# Patient Record
Sex: FEMALE
[De-identification: names, ages, dates, MRNs, and addresses within clinical notes are randomized per-mention and may not be internally consistent; named-entity substitution may affect disease eponyms.]

---

## 2019-11-14 ENCOUNTER — HOSPITAL ENCOUNTER (OUTPATIENT)
Dept: HOSPITAL 5 - FLUORO | Age: 79
Discharge: HOME | End: 2019-11-14
Attending: INTERNAL MEDICINE
Payer: MEDICARE

## 2019-11-14 DIAGNOSIS — Z88.8: ICD-10-CM

## 2019-11-14 DIAGNOSIS — K44.9: Primary | ICD-10-CM

## 2019-11-14 DIAGNOSIS — K22.4: ICD-10-CM

## 2019-11-14 DIAGNOSIS — Z88.5: ICD-10-CM

## 2019-11-14 PROCEDURE — 74246 X-RAY XM UPR GI TRC 2CNTRST: CPT

## 2019-11-14 NOTE — FLUOROSCOPY REPORT
UPPER GI SERIES WITH AIR CONTRAST



HISTORY: Oral pharyngeal dysphagia, functional dyspepsia



FINDINGS: 

2.0 minutes of fluoroscopy time was utilized. 50 fluoroscopic images were captured.



Deglutition is normal. No evidence for aspiration. A prominent cricopharyngeal muscle is identified s
uggesting cricopharyngeal dysphagia. The remainder of the esophagus is normal caliber and mucosal pat
tern. A small sliding hiatal hernia was witnessed with occasional episodes of reflux into the mid to 
distal esophagus. Occasional esophageal spasm was also witnessed.



There is mild mucosal thickening throughout the stomach suggesting a mild gastritis. No evidence for 
ulceration or mass.



The duodenal bulb and duodenal sweep are within normal limits.



IMPRESSION:

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

Small sliding hiatal hernia with occasional episodes of reflux.

Mild esophageal spasm.

Mild gastritis is suspected.



Signer Name: Mac Jara Jr, MD 

Signed: 11/14/2019 10:04 AM

 Workstation Name: [id number]